# Patient Record
Sex: FEMALE | Race: WHITE | NOT HISPANIC OR LATINO | URBAN - METROPOLITAN AREA
[De-identification: names, ages, dates, MRNs, and addresses within clinical notes are randomized per-mention and may not be internally consistent; named-entity substitution may affect disease eponyms.]

---

## 2019-01-11 ENCOUNTER — OFFICE VISIT (OUTPATIENT)
Dept: OPHTHALMOLOGY | Facility: CLINIC | Age: 44
End: 2019-01-11
Attending: OPHTHALMOLOGY
Payer: COMMERCIAL

## 2019-01-11 DIAGNOSIS — H53.10 SUBJECTIVE VISUAL DISTURBANCE: Primary | ICD-10-CM

## 2019-01-11 DIAGNOSIS — H53.10 SUBJECTIVE VISUAL DISTURBANCE: ICD-10-CM

## 2019-01-11 PROCEDURE — 92083 EXTENDED VISUAL FIELD XM: CPT | Mod: ZF | Performed by: OPHTHALMOLOGY

## 2019-01-11 PROCEDURE — G0463 HOSPITAL OUTPT CLINIC VISIT: HCPCS | Mod: ZF | Performed by: TECHNICIAN/TECHNOLOGIST

## 2019-01-11 RX ORDER — LEVOTHYROXINE SODIUM 100 UG/1
100 TABLET ORAL
COMMUNITY
Start: 2018-12-10

## 2019-01-11 SDOH — HEALTH STABILITY: MENTAL HEALTH: HOW OFTEN DO YOU HAVE A DRINK CONTAINING ALCOHOL?: NEVER

## 2019-01-11 ASSESSMENT — TONOMETRY
IOP_METHOD: ICARE
OS_IOP_MMHG: 10
OD_IOP_MMHG: 11

## 2019-01-11 ASSESSMENT — SLIT LAMP EXAM - LIDS
COMMENTS: NORMAL
COMMENTS: NORMAL

## 2019-01-11 ASSESSMENT — VISUAL ACUITY
OS_CC: 20/20
CORRECTION_TYPE: GLASSES
METHOD: SNELLEN - LINEAR
OD_CC: 20/20

## 2019-01-11 ASSESSMENT — CONF VISUAL FIELD
METHOD: COUNTING FINGERS
OD_NORMAL: 1
OS_NORMAL: 1

## 2019-01-11 ASSESSMENT — REFRACTION_WEARINGRX
OD_CYLINDER: +0.50
OD_SPHERE: -3.25
OS_SPHERE: -2.25
OS_AXIS: 105
SPECS_TYPE: SVL
OD_AXIS: 071
OS_CYLINDER: +0.25

## 2019-01-11 ASSESSMENT — EXTERNAL EXAM - LEFT EYE: OS_EXAM: NORMAL

## 2019-01-11 ASSESSMENT — EXTERNAL EXAM - RIGHT EYE: OD_EXAM: NORMAL

## 2019-01-11 ASSESSMENT — CUP TO DISC RATIO
OS_RATIO: 0.1
OD_RATIO: 0.1

## 2019-01-11 NOTE — PROGRESS NOTES
1. Acephalgic migraines    Myra Alicia is an 43 year old female who presents for evaluation of intermittent transient vision loss of the right eye.  Her past medical history is significant for graves disease on thyroid replacement.  Her symptoms started approximately one year ago.  Her episodes last no loner than 1-2 minutes.  The vision in the right eye goes completely white.  This is associated with a sensation of feeling odd, like she might pass out.  She has a history of migraines but feels that this is different.  There is no headache associated with these episodes.  She feels like this is happening more frequently as time goes on.  In the past year she has had 4 or 5 episodes in total. She also has ringing in her ears.  She feels like she can see flecks of rainbows on the outside of her vision when she is in an episode.  This occurs inconsistently, she may go many months without an episode.  Vision does not change when looking in extreme gaze directions.  No pulsatile tinnitus.  No regular headaches. No TVO's.  No history of vascular risk factors.  Only medication is synthroid.  She does not notice a color discrepancy between the two eyes.  She sometimes see afterimages following observation of kinetic movements.    Patient had radioactive iodine 10-12 years ago.     She has had MRI, CT, carotid studdies, echo, and EKG (reports reviewed)    MRI with and without 6/21/18  IMPRESSION:  1. No evidence for an acute intracranial abnormality.  No acute intracranial  hemorrhage, acute infarct, or intracranial mass.  2. No findings to explain the patient's symptoms.  3. Four tiny punctate foci of nonenhancing T2 FLAIR hyperintensities within the  deep frontal white matter, within normal limits in number for a patient of this  age.  This can be seen with chronic migraines.    CTA 6/21/18  IMPRESSION:  1. Unremarkable CTA of the head.  2. No acute intracranial abnormality.  No acute intracranial hemorrhage,  acute  infarct, or intracranial mass.  3. Tiny punctate radiodensity at the optic nerve head insertion, suggestive of  Drusen.    Carotid Ultrasound 6/18/18  IMPRESSION:  1. Less than 50% stenosis of the internal carotid arteries.  2. Antegrade blood flow in patent bilateral vertebral arteries.      On exam her vision is 20/20 in the right and left eyes.  There is no afferent pupillary defect and her eye pressures are normal.  Color plates are 9.5/11 in the right and 10/11 in the left.  Anterior segment exam is unremarkable.  Posterior segement exam is unremarkable, with healthy appearing vessels/macula/disc. Cranial nerves V1-3, 7,8,9,11, and 12 were normal bilaterally.     Octopus automated 30 degree visual fields are reliable and full in both eyes.    OCT of the optic nerve head revealed normal retinal nerve fiber layer in both eyes.    In summary this is an 43 year old female with transient visual symptoms of the right eye only.  Given her history, lack of findings on imaging, and reassuring normal exam, these stereotypical episodes represent acephalgic migraines.  Discussed at length with patient and reassurance given.  I would like to personally review the imaging which is unavailable at this time as it was completed in another hospital system.    The following over the counter supplements are safe and have been shown in limited studies to reduce frequency and severity of migraine headaches.  I discussed with the patient that these would all be reasonable treatments to trial and can be used together:    Magnesium oxide 200 mg twice a day OR magnesium citrate 300 mg twice a day   Riboflavin 400 mg daily  Coenzyme Q10 100 mg daily    I did not make a follow-up appointment, but I would be happy to see the patient back in the future should any new neuro-ophthalmic concern arise.      Complete documentation of historical and exam elements from today's encounter can be found in the full encounter summary report (not  reduplicated in this progress note).  I personally obtained the chief complaint(s) and history of present illness.  I confirmed and edited as necessary the review of systems, past medical/surgical history, family history, social history, and examination findings as documented by others; and I examined the patient myself.  I personally reviewed the relevant tests, images, and reports as documented above.  I formulated and edited as necessary the assessment and plan and discussed the findings and management plan with the patient and family.  I personally reviewed the ophthalmic test(s) associated with this encounter, agree with the interpretation(s) as documented by the resident/fellow, and have edited the corresponding report(s) as necessary.     MD Maxi Estrada M.D.  PGY-3, Ophthalmology

## 2019-01-11 NOTE — PATIENT INSTRUCTIONS
.The following over the counter supplements are safe and have been shown in limited studies to reduce frequency and severity of migraine headaches.  I discussed with the patient that these would all be reasonable treatments to trial and can be used together:    Magnesium oxide 200 mg twice a day OR magnesium citrate 300 mg twice a day   Riboflavin 400 mg daily  Coenzyme Q10 100 mg daily

## 2019-01-11 NOTE — LETTER
2019    RE: Myra Alicia  : 1975  MRN: [id number]    Dear Dr. Fitzgerald,    Thank you for referring your patient, Myra Alicia, to my neuro-ophthalmology clinic recently.  After a thorough neuro-ophthalmic history and examination, I came to the following conclusions:        1. Acephalgic migraines    Myra Alicia is an 43 year old female who presents for evaluation of intermittent transient vision loss of the right eye.  Her past medical history is significant for graves disease on thyroid replacement.  Her symptoms started approximately one year ago.  Her episodes last no loner than 1-2 minutes.  The vision in the right eye goes completely white.  This is associated with a sensation of feeling odd, like she might pass out.  She has a history of migraines but feels that this is different.  There is no headache associated with these episodes.  She feels like this is happening more frequently as time goes on.  In the past year she has had 4 or 5 episodes in total. She also has ringing in her ears.  She feels like she can see flecks of rainbows on the outside of her vision when she is in an episode.  This occurs inconsistently, she may go many months without an episode.  Vision does not change when looking in extreme gaze directions.  No pulsatile tinnitus.  No regular headaches. No TVO's.  No history of vascular risk factors.  Only medication is synthroid.  She does not notice a color discrepancy between the two eyes.  She sometimes see afterimages following observation of kinetic movements.    Patient had radioactive iodine 10-12 years ago.     She has had MRI, CT, carotid studdies, echo, and EKG (reports reviewed)    MRI with and without 18  IMPRESSION:  1. No evidence for an acute intracranial abnormality.  No acute intracranial  hemorrhage, acute infarct, or intracranial mass.  2. No findings to explain the patient's symptoms.  3. Four tiny punctate foci of nonenhancing T2 FLAIR  hyperintensities within the  deep frontal white matter, within normal limits in number for a patient of this  age.  This can be seen with chronic migraines.    CTA 6/21/18  IMPRESSION:  1. Unremarkable CTA of the head.  2. No acute intracranial abnormality.  No acute intracranial hemorrhage, acute  infarct, or intracranial mass.  3. Tiny punctate radiodensity at the optic nerve head insertion, suggestive of  Drusen.    Carotid Ultrasound 6/18/18  IMPRESSION:  1. Less than 50% stenosis of the internal carotid arteries.  2. Antegrade blood flow in patent bilateral vertebral arteries.      On exam her vision is 20/20 in the right and left eyes.  There is no afferent pupillary defect and her eye pressures are normal.  Color plates are 9.5/11 in the right and 10/11 in the left.  Anterior segment exam is unremarkable.  Posterior segement exam is unremarkable, with healthy appearing vessels/macula/disc. Cranial nerves V1-3, 7,8,9,11, and 12 were normal bilaterally.     Octopus automated 30 degree visual fields are reliable and full in both eyes.    OCT of the optic nerve head revealed normal retinal nerve fiber layer in both eyes.    In summary this is an 43 year old female with transient visual symptoms of the right eye only.  Given her history, lack of findings on imaging, and reassuring normal exam, these stereotypical episodes represent acephalgic migraines.  Discussed at length with patient and reassurance given.  I would like to personally review the imaging which is unavailable at this time as it was completed in another hospital system.    The following over the counter supplements are safe and have been shown in limited studies to reduce frequency and severity of migraine headaches.  I discussed with the patient that these would all be reasonable treatments to trial and can be used together:    Magnesium oxide 200 mg twice a day OR magnesium citrate 300 mg twice a day   Riboflavin 400 mg daily  Coenzyme Q10 100 mg  daily    I did not make a follow-up appointment, but I would be happy to see the patient back in the future should any new neuro-ophthalmic concern arise.    Again, thank you for trusting me with the care of your patient.  For further exam details, please feel free to contact our office for additional records.  If you wish to contact me regarding this patient please email me at [email or web address] or give my clinic a call to arrange a phone conversation.    Sincerely,    Gautam Ariza MD  , Neuro-Ophthalmology and Adult Strabismus Surgery  [facility] in Neuro-Ophthalmology  Department of Ophthalmology and Visual Neurosciences  [facility]    DX: acephalgic migraine visual aura

## 2019-01-11 NOTE — NURSING NOTE
Chief Complaint(s) and History of Present Illness(es)    Temporary Loss Of Vision In right eye. Onset: Since December 2017 and has occurred in right eye sporadically. This started 1 year ago.  Severity is mild. Occurring transiently (And inconsistently. First time happened, patient was bending down and PCP thought possibly high pressure however second time it occurred while she was driving). Timing: Transient visual loss RIGHT eye no longer than 2 minutes. Course: Happening more frequently. Associated symptoms: +ringing in right ear, saw ENT, removed wax however ringing persists. Sometimes louder than other. Never in left ear.  Comments    History of Graves Disease 10+ years and thyroid problems along with migraine, visual auras and sensitive to light  +muscle cramp back of head, right side. Denies double vision.   Xiomy Marroquin CO 1/11/2019 8:57 AM